# Patient Record
Sex: FEMALE | Race: WHITE | NOT HISPANIC OR LATINO | Employment: UNEMPLOYED | URBAN - METROPOLITAN AREA
[De-identification: names, ages, dates, MRNs, and addresses within clinical notes are randomized per-mention and may not be internally consistent; named-entity substitution may affect disease eponyms.]

---

## 2023-10-05 ENCOUNTER — TELEPHONE (OUTPATIENT)
Dept: PRIMARY CARE | Facility: CLINIC | Age: 58
End: 2023-10-05
Payer: COMMERCIAL

## 2023-10-05 DIAGNOSIS — I73.00 RAYNAUD'S DISEASE WITHOUT GANGRENE: ICD-10-CM

## 2023-10-05 DIAGNOSIS — E55.9 VITAMIN D DEFICIENCY: ICD-10-CM

## 2023-10-05 DIAGNOSIS — Z12.31 VISIT FOR SCREENING MAMMOGRAM: ICD-10-CM

## 2023-10-05 DIAGNOSIS — Z13.220 LIPID SCREENING: ICD-10-CM

## 2023-10-05 DIAGNOSIS — Z00.00 HEALTHCARE MAINTENANCE: Primary | ICD-10-CM

## 2023-10-05 DIAGNOSIS — M25.50 ARTHRALGIA, UNSPECIFIED JOINT: ICD-10-CM

## 2023-10-05 PROBLEM — L02.429 FURUNCLE OF THIGH: Status: ACTIVE | Noted: 2023-10-05

## 2023-10-05 PROBLEM — Z86.73 HISTORY OF TIA (TRANSIENT ISCHEMIC ATTACK): Status: ACTIVE | Noted: 2023-10-05

## 2023-10-05 PROBLEM — M79.89 INTERMITTENT PAIN AND SWELLING OF HAND: Status: ACTIVE | Noted: 2023-10-05

## 2023-10-05 PROBLEM — G44.86 CERVICOGENIC HEADACHE: Status: ACTIVE | Noted: 2023-10-05
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
## 2023-10-05 PROBLEM — M50.90 CERVICAL NECK PAIN WITH EVIDENCE OF DISC DISEASE: Status: ACTIVE | Noted: 2023-10-05

## 2023-10-05 PROBLEM — R74.8 ELEVATED LIVER ENZYMES: Status: ACTIVE | Noted: 2023-10-05

## 2023-10-05 PROBLEM — R53.83 FATIGUE: Status: ACTIVE | Noted: 2023-10-05

## 2023-10-05 PROBLEM — M79.643 INTERMITTENT PAIN AND SWELLING OF HAND: Status: ACTIVE | Noted: 2023-10-05

## 2023-10-05 PROBLEM — R77.2 ELEVATED ALPHA-FETOPROTEIN: Status: ACTIVE | Noted: 2023-10-05

## 2023-10-05 PROBLEM — M54.12 CERVICAL RADICULOPATHY AT C5: Status: ACTIVE | Noted: 2023-10-05

## 2023-10-05 PROBLEM — D12.6 COLON ADENOMAS: Status: ACTIVE | Noted: 2023-10-05

## 2023-10-05 PROBLEM — R31.21 ASYMPTOMATIC MICROSCOPIC HEMATURIA: Status: ACTIVE | Noted: 2023-10-05

## 2023-10-05 PROBLEM — Z12.11 COLON CANCER SCREENING: Status: ACTIVE | Noted: 2023-10-05

## 2023-10-05 RX ORDER — FUROSEMIDE 20 MG/1
20 TABLET ORAL 2 TIMES DAILY
COMMUNITY
Start: 2021-08-12 | End: 2023-12-04 | Stop reason: ALTCHOICE

## 2023-10-05 RX ORDER — CLOBETASOL PROPIONATE 0.5 MG/G
1 EMULSION TOPICAL 2 TIMES DAILY
COMMUNITY
Start: 2021-07-07 | End: 2023-12-21 | Stop reason: WASHOUT

## 2023-10-05 RX ORDER — ASPIRIN 81 MG/1
81 TABLET ORAL DAILY
COMMUNITY
Start: 2021-06-30 | End: 2023-12-04 | Stop reason: ALTCHOICE

## 2023-10-05 RX ORDER — ERGOCALCIFEROL 1.25 MG/1
50000 CAPSULE ORAL 2 TIMES WEEKLY
COMMUNITY
Start: 2020-11-03 | End: 2023-12-04 | Stop reason: ALTCHOICE

## 2023-10-05 RX ORDER — CYCLOBENZAPRINE HCL 5 MG
5 TABLET ORAL 3 TIMES DAILY PRN
COMMUNITY
Start: 2022-07-08 | End: 2023-12-04 | Stop reason: ALTCHOICE

## 2023-10-05 NOTE — TELEPHONE ENCOUNTER
Pt set up a physical for December and would like to have an order for a mammogram and would like to blood work order as well however she also wanted to know if there are any inflammation markers that could be added for issues with pain. Please advise on orders

## 2023-11-21 ENCOUNTER — TELEPHONE (OUTPATIENT)
Dept: PRIMARY CARE | Facility: CLINIC | Age: 58
End: 2023-11-21
Payer: COMMERCIAL

## 2023-11-21 NOTE — TELEPHONE ENCOUNTER
Pt called to confirm appt on 12/4/23 for CPE. Pt mention she has had hard swollen lymph node in armpit occurring for 1wk.

## 2023-12-01 ENCOUNTER — LAB (OUTPATIENT)
Dept: LAB | Facility: LAB | Age: 58
End: 2023-12-01
Payer: COMMERCIAL

## 2023-12-01 DIAGNOSIS — I73.00 RAYNAUD'S DISEASE WITHOUT GANGRENE: ICD-10-CM

## 2023-12-01 DIAGNOSIS — E55.9 VITAMIN D DEFICIENCY: ICD-10-CM

## 2023-12-01 DIAGNOSIS — Z00.00 HEALTHCARE MAINTENANCE: ICD-10-CM

## 2023-12-01 DIAGNOSIS — M25.50 ARTHRALGIA, UNSPECIFIED JOINT: ICD-10-CM

## 2023-12-01 DIAGNOSIS — Z13.220 LIPID SCREENING: ICD-10-CM

## 2023-12-01 LAB
25(OH)D3 SERPL-MCNC: 21 NG/ML (ref 30–100)
ALBUMIN SERPL BCP-MCNC: 4.2 G/DL (ref 3.4–5)
ALP SERPL-CCNC: 117 U/L (ref 33–110)
ALT SERPL W P-5'-P-CCNC: 44 U/L (ref 7–45)
ANION GAP SERPL CALC-SCNC: 13 MMOL/L (ref 10–20)
AST SERPL W P-5'-P-CCNC: 27 U/L (ref 9–39)
BASOPHILS # BLD AUTO: 0.03 X10*3/UL (ref 0–0.1)
BASOPHILS NFR BLD AUTO: 0.4 %
BILIRUB SERPL-MCNC: 0.5 MG/DL (ref 0–1.2)
BUN SERPL-MCNC: 12 MG/DL (ref 6–23)
CALCIUM SERPL-MCNC: 9.2 MG/DL (ref 8.6–10.3)
CCP IGG SERPL-ACNC: <1 U/ML
CHLORIDE SERPL-SCNC: 103 MMOL/L (ref 98–107)
CHOLEST SERPL-MCNC: 215 MG/DL (ref 0–199)
CHOLESTEROL/HDL RATIO: 3.4
CO2 SERPL-SCNC: 28 MMOL/L (ref 21–32)
CREAT SERPL-MCNC: 0.58 MG/DL (ref 0.5–1.05)
CRP SERPL-MCNC: 1.47 MG/DL
EOSINOPHIL # BLD AUTO: 0.03 X10*3/UL (ref 0–0.7)
EOSINOPHIL NFR BLD AUTO: 0.4 %
ERYTHROCYTE [DISTWIDTH] IN BLOOD BY AUTOMATED COUNT: 13.1 % (ref 11.5–14.5)
ERYTHROCYTE [SEDIMENTATION RATE] IN BLOOD BY WESTERGREN METHOD: 6 MM/H (ref 0–30)
GFR SERPL CREATININE-BSD FRML MDRD: >90 ML/MIN/1.73M*2
GLUCOSE SERPL-MCNC: 91 MG/DL (ref 74–99)
HCT VFR BLD AUTO: 39.2 % (ref 36–46)
HDLC SERPL-MCNC: 64.1 MG/DL
HGB BLD-MCNC: 12.2 G/DL (ref 12–16)
IMM GRANULOCYTES # BLD AUTO: 0.01 X10*3/UL (ref 0–0.7)
IMM GRANULOCYTES NFR BLD AUTO: 0.1 % (ref 0–0.9)
LDLC SERPL CALC-MCNC: 134 MG/DL
LYMPHOCYTES # BLD AUTO: 1.71 X10*3/UL (ref 1.2–4.8)
LYMPHOCYTES NFR BLD AUTO: 23.3 %
MCH RBC QN AUTO: 28.8 PG (ref 26–34)
MCHC RBC AUTO-ENTMCNC: 31.1 G/DL (ref 32–36)
MCV RBC AUTO: 93 FL (ref 80–100)
MONOCYTES # BLD AUTO: 0.52 X10*3/UL (ref 0.1–1)
MONOCYTES NFR BLD AUTO: 7.1 %
NEUTROPHILS # BLD AUTO: 5.05 X10*3/UL (ref 1.2–7.7)
NEUTROPHILS NFR BLD AUTO: 68.7 %
NON HDL CHOLESTEROL: 151 MG/DL (ref 0–149)
NRBC BLD-RTO: 0 /100 WBCS (ref 0–0)
PLATELET # BLD AUTO: 343 X10*3/UL (ref 150–450)
POTASSIUM SERPL-SCNC: 4.5 MMOL/L (ref 3.5–5.3)
PROT SERPL-MCNC: 7 G/DL (ref 6.4–8.2)
RBC # BLD AUTO: 4.24 X10*6/UL (ref 4–5.2)
RHEUMATOID FACT SER NEPH-ACNC: <10 IU/ML (ref 0–15)
SODIUM SERPL-SCNC: 139 MMOL/L (ref 136–145)
TRIGL SERPL-MCNC: 86 MG/DL (ref 0–149)
TSH SERPL-ACNC: 1.47 MIU/L (ref 0.44–3.98)
URATE SERPL-MCNC: 4.7 MG/DL (ref 2.3–6.7)
VLDL: 17 MG/DL (ref 0–40)
WBC # BLD AUTO: 7.4 X10*3/UL (ref 4.4–11.3)

## 2023-12-01 PROCEDURE — 80061 LIPID PANEL: CPT

## 2023-12-01 PROCEDURE — 36415 COLL VENOUS BLD VENIPUNCTURE: CPT

## 2023-12-01 PROCEDURE — 85652 RBC SED RATE AUTOMATED: CPT

## 2023-12-01 PROCEDURE — 86140 C-REACTIVE PROTEIN: CPT

## 2023-12-01 PROCEDURE — 82306 VITAMIN D 25 HYDROXY: CPT

## 2023-12-01 PROCEDURE — 84443 ASSAY THYROID STIM HORMONE: CPT

## 2023-12-01 PROCEDURE — 84550 ASSAY OF BLOOD/URIC ACID: CPT

## 2023-12-01 PROCEDURE — 86200 CCP ANTIBODY: CPT

## 2023-12-01 PROCEDURE — 85025 COMPLETE CBC W/AUTO DIFF WBC: CPT

## 2023-12-01 PROCEDURE — 80053 COMPREHEN METABOLIC PANEL: CPT

## 2023-12-01 PROCEDURE — 86038 ANTINUCLEAR ANTIBODIES: CPT

## 2023-12-01 PROCEDURE — 86431 RHEUMATOID FACTOR QUANT: CPT

## 2023-12-03 NOTE — PROGRESS NOTES
"Subjective   Patient ID: Gina Montes De Oca is a 58 y.o. female who presents for her annual physical     She feels horrible daily. She feels stiff.  \"Everything hurts\" She takes 2 Ibuprofen when she has aching and it makes a huge difference     She reports fighting UR Sx for the past week.     She admits she has not been very active     She is scheduled for her mammogram within a couple weeks     She is wearing her old glasses because she broke her new ones  She needs more correction as she has aged after having LASIK    HEALTH:  PAP 10/11, 9/13 +ASCUS, 12/17 -,  8/2022 -  Mammo 10/13 , 12/14, 10/17, 9/19, 10/2020, 11/2021, ordered 12/2023  BD 4/04 T-1.0 heel , 10/17 normal and next 55 -60  Colon 12/17 2 adenomas, 9/2022 and adenoma and Q 5   EKG 3/13 AND 12/15 , 12/ 17, 9/19, 6/2021   Urine 2014 , 12/15 , 12/17, 9/19  Hep C negative in 12/2020   Hep B 4/2021 and 5/2021  Flu 2014, 12/15 , 10/17, 9/19, 9/2020, 2021, 2022, 11/8/2023  TDAP 2010, 8/2/2022  MMR 9/19  Shingrix 11/3/2020 and 2/9/2021   Pfizer CVD vaccine 3/10/2021 and 3/30/2021, 11/8/2023  Ophth She had LASIK in 2000 OU. No history of glaucoma or MD. She is back in glasses almost all the time        Review of Systems  All systems negative except those listed in the HPI       Past Medical, Surgical, and Family History reviewed and updated in chart.  Reviewed all medications by prescribing practitioner or clinical pharmacist   (such as prescriptions, OTCs, herbal therapies and supplements) and documented in the medical record       Objective   Visit Vitals  /70 (BP Location: Left arm, Patient Position: Sitting)   Pulse 76   Temp 36.2 °C (97.1 °F) (Temporal)    Body mass index is 27.62 kg/m².      Physical Exam  Vitals reviewed.   Constitutional:       Appearance: Normal appearance.   HENT:      Head: Normocephalic.      Right Ear: Tympanic membrane, ear canal and external ear normal.      Left Ear: Tympanic membrane, ear canal and external ear normal. "      Nose: Nose normal.      Mouth/Throat:      Pharynx: Oropharynx is clear.   Eyes:      Conjunctiva/sclera: Conjunctivae normal.   Cardiovascular:      Rate and Rhythm: Normal rate and regular rhythm.      Pulses: Normal pulses.      Heart sounds: Normal heart sounds.   Pulmonary:      Effort: Pulmonary effort is normal.      Breath sounds: Normal breath sounds.   Chest:      Comments: Breast exam shows dense breast with fibrocystic changes bilaterally, no masses felt   Abdominal:      General: Bowel sounds are normal.      Palpations: Abdomen is soft.   Musculoskeletal:         General: Normal range of motion.      Cervical back: Normal range of motion and neck supple.      Comments:  neck and shoulder still tight    Skin:     General: Skin is warm.   Neurological:      General: No focal deficit present.      Mental Status: She is alert and oriented to person, place, and time.   Psychiatric:         Mood and Affect: Mood normal.         Behavior: Behavior normal.         Thought Content: Thought content normal.         Judgment: Judgment normal.       Assessment/Plan   Problem List Items Addressed This Visit       Vitamin D deficiency - Primary    Relevant Medications    ergocalciferol (Vitamin D-2) 1.25 MG (12442 UT) capsule       Annual physical completed  Reviewed her labs from 12/2023  CRP 1.47  JAMES negative   RF negative       Health Maintenence completed  -  Discussed healthy diet and regular exercise.    -  Physical exam overall unremarkable. Immunizations reviewed and updated accordingly. Healthy lifestyle choices discussed (tobacco avoidance, appropriate alcohol use, avoidance of illicit substances).   -  Patient is wearing seatbelt.   -  Screening lab work ordered as indicated.    -  Age appropriate screening tests reviewed with patient.       Her weight in office today is 166 with BMI of 27.62. We spent 15 minutes discussing diet and weight loss. The struggle of weight loss persists    Discussed  getting BMI as close to 25 as possible.      BP have been in normal range in office. We will continue to monitor   EKG was normal 6/2021   Recommend she monitor her BP periodically and call with elevated readings      I have spent 15 min face to face with this patient discussing their cardiac risk and behavioral therapies of nutrition choices and exercise. We are trying to eliminate habits that are contributing to their cardiac risk.  We agreed on a plan of how they can reduce their current CV risk   The patient's  10 yr CV risk was estimated at  1.5 % 12/2023     Elevated lipids:  and HDL 64 on labs in 12/2023  This is familial hypercholesteremia   Explained goal for LDL to be less than 100 and ideally less than 70   Continue Lipitor 40 mg daily and ASA 81 mg daily   Discussed diet and exercise.    Elevated liver enzymes and elevated alpha- fetoprotein: LFT stable on labs in 12/2023  Liver US normal 1/2021  Fibroscan 2021 normal per patient - I could not find the results of this test  Continue to follow with Dr Bal       Migraine - felt complex migraine   Admitted on 6/27/2021 and discharged on 6/28/2021    TIA was unconfirmed. All tests ruled out TIA   She had CARD and left side and right face/ arm and leg paresthesia   CT of brain was negative for acute process 6/2021   MRI of brain 7/6/2021 negative for acute process   MRI of spine 6/2021 moderate bilaterally varying degrees of stenosis but no high grade compression   ECHO 6/2021 showed LVEF 62%  Evaluated by neuro while in the hospital   She is not prone to migraines continuously and I do not recommend medications for treatment at this time      Increased stressors:  Her  finished chemo and his last scan showed he is clear of cancer.   He had surgery in [place]. They took 1/2 his liver and 2/3 of his colon and he completed 6 months of chemo  Her son had facial surgery 5/2021 due to baseball injury      Cervical radiculopathy w/ radiating pain/  "paresthesia in the right arm: On exam: neck and shoulder still tight 12/2023. She feels horrible daily. She feels stiff. \"Everything hurts\" She takes 2 Ibuprofen when she has aching and it makes a huge difference. Recommend she avoid caffeine and stimulants. Drink plenty of water. Discussed doing a plant based /whole foods diet. Her Vitamin D levels are low and may be contributing to her aching. She will restart scripted Vitamin D and we will see if that helps   Recommend she take Ibuprofen 3 tablets daily for 10 days to help decrease the inflammation   She has neck pain radiating into her shoulder and down the right arm   She is waking with HA due to neck pain.   She stretches her neck gently and it does loosen up during the day   She has trouble opening a bottle of water. She is not dropping things   She tried massage therapy for a while but could not tell any improvement   MRI of spine 6/2021 moderate bilaterally varying degrees of stenosis, no high grade compression   She did PT for her neck in the past.   She is to continue to move her neck and shoulders to avoid frozen shoulder      Arthritis and intermittent edema to hands bilaterally:   Inflammatory labs negative in 7/2021  She notices joint pain in fingers on her right hand.  Her sister has RA   Continue furosemide 20 mg daily.     Raynaud's: JAMES negative for the first time on labs in 12/2023  Stable. + JAMES only 12/2020  Keep hands and feet warm during winter months     Vitamin D def: Levels 21 on labs in 12/2023. Explained with low levels of Vitamin D that can increase her aching in her joints   I would like to see her levels in the 40's   Continue scripted Vitamin D 50K UT twice weekly for 3 months.  After 3 months she will change to OTC Vitamin D 5000 UT daily      Mild prolapse uterus: Pap normal 8/2022.   Vaginal exam small polyp noted 8/2022      Mammo was normal in 11/2021 and orders placed 8/2022  Breast exam shows dense breast with fibrocystic " changes bilaterally, no masses felt 12/2023  Discussed MRI of breast due to breast density. Explained out of pocket cost with screening 8/2022- she will consider     BD was normal 10/17 and repeat in 5 -10 yrs. Continue OTC Calcium 600 mg daily along with 2 servings of calcium enriched foods. Continue scripted Vitamin D twice weekly and discussed importance of weight bearing exercises  Colon 9/2022 adenoma and Q 5      Ophth:  She had LASIK in 2000 OU. No history of glaucoma or MD.   She is back in glasses almost all the time   She will have her next eye exam faxed to my office in order to update her medical records.      Hep C negative in 12/2020   Hep B 4/2021 and 5/2021  Flu 2014, 12/15 , 10/17, 9/19, 9/2020, 2021, 2022, 11/8/2023  TDAP 2010, 8/2/2022  MMR 9/19  Shingrix 11/3/2020 and 2/9/2021   Pfizer CVD vaccine 3/10/2021 and 3/30/2021, 11/8/2023    Some elements in the chart were copied from Dr. Gurrola's last office visit with patient.   Notes have been updated where appropriate, and reflect my current medical decision making from today.      Return in 1 year for CPE or sooner if needed   (CPE due 12/2024)     Scribe Attestation  By signing my name below, I, Carly Melara , Scribe   attest that this documentation has been prepared under the direction and in the presence of Alicja Gurrola MD.

## 2023-12-04 ENCOUNTER — OFFICE VISIT (OUTPATIENT)
Dept: PRIMARY CARE | Facility: CLINIC | Age: 58
End: 2023-12-04
Payer: COMMERCIAL

## 2023-12-04 VITALS
TEMPERATURE: 97.1 F | BODY MASS INDEX: 27.66 KG/M2 | HEIGHT: 65 IN | SYSTOLIC BLOOD PRESSURE: 110 MMHG | OXYGEN SATURATION: 99 % | DIASTOLIC BLOOD PRESSURE: 70 MMHG | HEART RATE: 76 BPM | WEIGHT: 166 LBS

## 2023-12-04 DIAGNOSIS — Z86.73 HISTORY OF TIA (TRANSIENT ISCHEMIC ATTACK): ICD-10-CM

## 2023-12-04 DIAGNOSIS — E55.9 VITAMIN D DEFICIENCY: ICD-10-CM

## 2023-12-04 DIAGNOSIS — Z00.00 HEALTHCARE MAINTENANCE: Primary | ICD-10-CM

## 2023-12-04 DIAGNOSIS — M50.90 CERVICAL NECK PAIN WITH EVIDENCE OF DISC DISEASE: ICD-10-CM

## 2023-12-04 DIAGNOSIS — I73.00 RAYNAUD'S DISEASE WITHOUT GANGRENE: ICD-10-CM

## 2023-12-04 DIAGNOSIS — G43.109 MIGRAINE WITH AURA AND WITHOUT STATUS MIGRAINOSUS, NOT INTRACTABLE: ICD-10-CM

## 2023-12-04 LAB — ANA SER QL HEP2 SUBST: NEGATIVE

## 2023-12-04 PROCEDURE — 1036F TOBACCO NON-USER: CPT | Performed by: INTERNAL MEDICINE

## 2023-12-04 PROCEDURE — 99396 PREV VISIT EST AGE 40-64: CPT | Performed by: INTERNAL MEDICINE

## 2023-12-04 RX ORDER — ERGOCALCIFEROL 1.25 MG/1
50000 CAPSULE ORAL 2 TIMES WEEKLY
Qty: 24 CAPSULE | Refills: 1 | Status: SHIPPED | OUTPATIENT
Start: 2023-12-04 | End: 2024-05-16

## 2023-12-04 ASSESSMENT — PATIENT HEALTH QUESTIONNAIRE - PHQ9
1. LITTLE INTEREST OR PLEASURE IN DOING THINGS: NOT AT ALL
SUM OF ALL RESPONSES TO PHQ9 QUESTIONS 1 AND 2: 0
2. FEELING DOWN, DEPRESSED OR HOPELESS: NOT AT ALL

## 2023-12-05 PROBLEM — M79.643 INTERMITTENT PAIN AND SWELLING OF HAND: Status: RESOLVED | Noted: 2023-10-05 | Resolved: 2023-12-05

## 2023-12-05 PROBLEM — M79.89 INTERMITTENT PAIN AND SWELLING OF HAND: Status: RESOLVED | Noted: 2023-10-05 | Resolved: 2023-12-05
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
## 2023-12-11 ENCOUNTER — ANCILLARY PROCEDURE (OUTPATIENT)
Dept: RADIOLOGY | Facility: CLINIC | Age: 58
End: 2023-12-11
Payer: COMMERCIAL

## 2023-12-11 DIAGNOSIS — Z12.31 ENCOUNTER FOR SCREENING MAMMOGRAM FOR MALIGNANT NEOPLASM OF BREAST: ICD-10-CM

## 2023-12-11 PROCEDURE — 77063 BREAST TOMOSYNTHESIS BI: CPT

## 2023-12-11 PROCEDURE — 77063 BREAST TOMOSYNTHESIS BI: CPT | Mod: BILATERAL PROCEDURE | Performed by: RADIOLOGY

## 2023-12-11 PROCEDURE — 77067 SCR MAMMO BI INCL CAD: CPT | Mod: BILATERAL PROCEDURE | Performed by: RADIOLOGY

## 2023-12-21 ENCOUNTER — OFFICE VISIT (OUTPATIENT)
Dept: PRIMARY CARE | Facility: CLINIC | Age: 58
End: 2023-12-21
Payer: COMMERCIAL

## 2023-12-21 VITALS
HEART RATE: 66 BPM | DIASTOLIC BLOOD PRESSURE: 68 MMHG | HEIGHT: 65 IN | SYSTOLIC BLOOD PRESSURE: 116 MMHG | BODY MASS INDEX: 27.86 KG/M2 | WEIGHT: 167.2 LBS | OXYGEN SATURATION: 98 %

## 2023-12-21 DIAGNOSIS — L03.116 CELLULITIS OF LEFT LOWER EXTREMITY: Primary | ICD-10-CM

## 2023-12-21 PROCEDURE — 1036F TOBACCO NON-USER: CPT | Performed by: INTERNAL MEDICINE

## 2023-12-21 PROCEDURE — 99214 OFFICE O/P EST MOD 30 MIN: CPT | Performed by: INTERNAL MEDICINE

## 2023-12-21 RX ORDER — CEPHALEXIN 500 MG/1
500 CAPSULE ORAL 3 TIMES DAILY
Qty: 30 CAPSULE | Refills: 0 | Status: SHIPPED | OUTPATIENT
Start: 2023-12-21 | End: 2023-12-31

## 2023-12-21 NOTE — PROGRESS NOTES
"Subjective   Patient ID: [id number]      Gina Montes De Oca is a 58 y.o. female who presents for Rash (Patient c/o rash on left foot. Onset about 2 weeks ago. Patient admits to swelling and itching. Rash is red. Patient has applied gold bond powder. Patient states that today her foot does feel slightly better.).    Current Outpatient Medications:     ergocalciferol (Vitamin D-2) 1.25 MG (63613 UT) capsule, Take 1 capsule (50,000 Units) by mouth 2 times a week., Disp: 24 capsule, Rfl: 1  HPI  58-year-old patient presented to the office to discuss concerns regarding erythema in her foot .  Patient stated that she started experiencing discomfort in the fourth and fifth digits and it was itchy and she felt that there might be a rash in between and then she started noticing increased redness involving the left foot and swelling as well it was tender and slightly warm and she became concerned.  Yesterday she felt a little bit warm and had some chills but for the most part she feels fine and she has good appetite and she does not feel weak.  Review of system was reviewed all normal except what is noted in HPI   Past Medical History:   Diagnosis Date    Allergic contact dermatitis due to plants, except food 10/10/2016    Contact dermatitis due to poison ivy    Cellulitis of left upper limb 10/13/2017    Cellulitis of left upper arm    Cellulitis of right lower limb 10/17/2017    Cellulitis of right ankle    Hemiplegic migraine, not intractable, without status migrainosus 07/07/2021    Hemiplegic migraine without status migrainosus, not intractable    Personal history of other specified conditions 03/01/2013    History of chest pain      Objective   /68 (BP Location: Right arm, Patient Position: Sitting)   Pulse 66   Ht 1.651 m (5' 5\")   Wt 75.8 kg (167 lb 3.2 oz)   SpO2 98%   BMI 27.82 kg/m²      Physical Exam    Assessment/Plan   Problem List Items Addressed This Visit    None  58-year-old patient with the " following issues.    1.  Symptoms suggestive of cellulitis of the left foot at this point we will treat the patient with a course of antibiotic a prescription for Keflex was sent to the pharmacy and she was instructed to keep the leg elevated and apply ice packs.  Patient stated understanding and she was instructed if her symptoms worsen or do not improve she is to present for further evaluation.    Ivania Lackey MD

## 2024-02-13 ENCOUNTER — TELEPHONE (OUTPATIENT)
Dept: PRIMARY CARE | Facility: CLINIC | Age: 59
End: 2024-02-13
Payer: COMMERCIAL

## 2024-02-13 NOTE — TELEPHONE ENCOUNTER
She is in a research study and had an ecg yesterday, she was told she has had a possible inferior infarction. Can you please call her when you have some time?

## 2024-04-17 ENCOUNTER — HOSPITAL ENCOUNTER (OUTPATIENT)
Dept: RADIOLOGY | Facility: CLINIC | Age: 59
Discharge: HOME | End: 2024-04-17
Payer: COMMERCIAL

## 2024-04-17 ENCOUNTER — OFFICE VISIT (OUTPATIENT)
Dept: PRIMARY CARE | Facility: CLINIC | Age: 59
End: 2024-04-17
Payer: COMMERCIAL

## 2024-04-17 VITALS
OXYGEN SATURATION: 98 % | BODY MASS INDEX: 27.82 KG/M2 | SYSTOLIC BLOOD PRESSURE: 110 MMHG | HEART RATE: 68 BPM | HEIGHT: 65 IN | TEMPERATURE: 97.6 F | WEIGHT: 167 LBS | DIASTOLIC BLOOD PRESSURE: 70 MMHG

## 2024-04-17 DIAGNOSIS — L02.612 ABSCESS OR CELLULITIS OF TOE, LEFT: ICD-10-CM

## 2024-04-17 DIAGNOSIS — L02.612 ABSCESS OR CELLULITIS OF TOE, LEFT: Primary | ICD-10-CM

## 2024-04-17 DIAGNOSIS — L03.032 ABSCESS OR CELLULITIS OF TOE, LEFT: Primary | ICD-10-CM

## 2024-04-17 DIAGNOSIS — L03.032 ABSCESS OR CELLULITIS OF TOE, LEFT: ICD-10-CM

## 2024-04-17 PROCEDURE — 99214 OFFICE O/P EST MOD 30 MIN: CPT | Performed by: INTERNAL MEDICINE

## 2024-04-17 PROCEDURE — 1036F TOBACCO NON-USER: CPT | Performed by: INTERNAL MEDICINE

## 2024-04-17 PROCEDURE — 73620 X-RAY EXAM OF FOOT: CPT | Mod: LT

## 2024-04-17 PROCEDURE — 73620 X-RAY EXAM OF FOOT: CPT | Mod: LEFT SIDE | Performed by: RADIOLOGY

## 2024-04-17 RX ORDER — DOXYCYCLINE 100 MG/1
100 CAPSULE ORAL 2 TIMES DAILY
Qty: 28 CAPSULE | Refills: 0 | Status: SHIPPED | OUTPATIENT
Start: 2024-04-17 | End: 2024-05-01

## 2024-04-17 ASSESSMENT — PATIENT HEALTH QUESTIONNAIRE - PHQ9
SUM OF ALL RESPONSES TO PHQ9 QUESTIONS 1 AND 2: 0
2. FEELING DOWN, DEPRESSED OR HOPELESS: NOT AT ALL
1. LITTLE INTEREST OR PLEASURE IN DOING THINGS: NOT AT ALL

## 2024-04-17 NOTE — PROGRESS NOTES
Subjective   Patient ID: Gina Montes De Oca is a 58 y.o. female who presents for evaluation for left foot infection involving the 4th and 5th metatarsal.       She is here for a possible foot infection on her left foot involving the 4th and 5th metatarsal.   It is red and painful  She saw another physician in 12/2023 and was placed on Keflex   She is putting her foot in Epsom salt BID and she had her Sx controlled.  She walked all day Saturday 4/13/2024 and she started developing Sx again and it is spreading           HEALTH:  PAP 10/11, 9/13 +ASCUS, 12/17 -,  8/22 -  Mammo 10/13, 12/14, 10/17, 9/19, 10/20, 11/21, ordered 12/23  BD 4/04 T-1.0 heel, 10/17 normal and next 55 -60  Colon 12/17 adenomas, 9/2022 and adenoma and Q 5   EKG 3/13, 12/15, 12/17, 9/19, 6/21   Urine 2014, 12/15, 12/17, 9/19  Hep C negative in 12/2020   Hep B 4/2021 and 5/2021  Flu 10/17, 9/19, 9/20, 2021, 2022, 11/23  TDAP 2010, 8/2/2022  MMR 9/19  Shingrix 11/3/2020 and 2/9/2021   Pfizer CVD vaccine 3/10/2021 and 3/30/2021, 11/8/2023  Ophth She had LASIK in 2000 OU. No history of glaucoma or MD. She is back in glasses almost all the time            Review of Systems  All systems negative except those listed in the HPI      Objective   Visit Vitals  /70 (BP Location: Left arm, Patient Position: Sitting)   Pulse 68   Temp 36.4 °C (97.6 °F) (Temporal)    Body mass index is 27.79 kg/m².     Physical Exam  Vitals reviewed.   Constitutional:       Appearance: Normal appearance.   HENT:      Head: Normocephalic.      Right Ear: Tympanic membrane, ear canal and external ear normal.      Left Ear: Tympanic membrane, ear canal and external ear normal.      Nose: Nose normal.      Mouth/Throat:      Pharynx: Oropharynx is clear.   Eyes:      Conjunctiva/sclera: Conjunctivae normal.   Cardiovascular:      Rate and Rhythm: Normal rate and regular rhythm.      Pulses: Normal pulses.      Heart sounds: Normal heart sounds.   Pulmonary:      Effort:  Pulmonary effort is normal.      Breath sounds: Normal breath sounds.   Abdominal:      General: Bowel sounds are normal.      Palpations: Abdomen is soft.   Musculoskeletal:         General: Normal range of motion.      Cervical back: Normal range of motion and neck supple.   Skin:     General: Skin is warm.      Comments: there is a abscess by the nail of the 5th toe, primary infection is the medial 5th toe, she has erythema top foot and 4th toe, no skin breakdown   Neurological:      General: No focal deficit present.      Mental Status: She is alert and oriented to person, place, and time.   Psychiatric:         Mood and Affect: Mood normal.         Behavior: Behavior normal.         Thought Content: Thought content normal.         Judgment: Judgment normal.       Assessment/Plan   Problem List Items Addressed This Visit    None  Visit Diagnoses       Abscess or cellulitis of toe, left    -  Primary    Relevant Medications    doxycycline (Vibramycin) 100 mg capsule    Other Relevant Orders    XR foot left 1-2 views    Referral to Podiatry                Follow up completed    Abscess or cellulitis left toe: On exam: there is a abscess by the nail of the 5th toe, primary infection is the medial 5th toe, she has erythema top foot and 4th toe, no skin breakdown 4/2024. Explained I think this is due to her toes rubbing while in shoes.  She is here for a possible foot infection on her left foot involving the 4th and 5th metatarsal.   It is red and painful. This is the 3 rd occurrence of this infection she has had   She saw another physician in 12/2023 and was placed on Keflex   She was putting her foot in Epsom salt BID and she had her Sx controlled.  She walked all day Saturday 4/13/2024 and she started developing Sx again and it is spreading   Prescription sent in for doxycycline 100 mg BID for 14 days, possible side effects discussed with medication.   Recommend and orders placed for Xray left foot for further  evaluation 4/24  Recommend and orders placed for podiatry for further evaluation 4/24  She is going to need a toe separator of some kind   She will call if Sx persist or worsen     Her weight in office today is 167 with BMI of 27.79. We spent 15 minutes discussing diet and weight loss. The struggle of weight loss persists    Discussed getting BMI as close to 25 as possible.      BP have been in normal range in office. We will continue to monitor   EKG was normal 6/2021   Recommend she monitor her BP periodically and call with elevated readings      We discussed the patients cardiovascular risk. If needed, lifestyle modifications recommended including: behavioral therapies of nutrition choices, exercise and eliminate habits that are contributing to their cardiac risk. We agreed to a plan to decrease his cardiovascular risks. Discussed ASA. Reviewed Guidelines and approved recommendations made to patient.   The patient's 10 yr CV risk was estimated at  1.7% 4/2024      Elevated lipids:  and HDL 64 on labs in 12/2023  This is familial hypercholesteremia   Explained goal for LDL to be less than 100 and ideally less than 70   Continue Lipitor 40 mg daily and ASA 81 mg daily   Discussed diet and exercise.     Elevated liver enzymes and elevated alpha- fetoprotein: LFT stable on labs in 12/2023  Liver US normal 1/2021  Fibroscan 2021 normal per patient - I could not find the results of this test  Continue to follow with Dr Bal       Migraine - felt complex migraine   Admitted on 6/27/2021 and discharged on 6/28/2021    TIA was unconfirmed. All tests ruled out TIA   She had CARD and left side and right face/ arm and leg paresthesia   CT of brain was negative for acute process 6/2021   MRI of brain 7/6/2021 negative for acute process   MRI of spine 6/2021 moderate bilaterally varying degrees of stenosis but no high grade compression   ECHO 6/2021 showed LVEF 62%  Evaluated by neuro while in the hospital   She is not  "prone to migraines continuously and I do not recommend medications for treatment at this time      Increased stressors:  Her  finished chemo and his last scan showed he is clear of cancer.   He had surgery in [place]. They took 1/2 his liver and 2/3 of his colon and he completed 6 months of chemo  Her son had facial surgery 5/2021 due to baseball injury      Cervical radiculopathy w/ radiating pain/ paresthesia in the right arm: On exam: neck and shoulder still tight 12/2023. She feels horrible daily. She feels stiff. \"Everything hurts\" She takes 2 Ibuprofen when she has aching and it makes a huge difference. Recommend she avoid caffeine and stimulants. Drink plenty of water. Discussed doing a plant based /whole foods diet. Her Vitamin D levels are low and may be contributing to her aching. She will restart scripted Vitamin D and we will see if that helps   Recommend she take Ibuprofen 3 tablets daily for 10 days to help decrease the inflammation   She has neck pain radiating into her shoulder and down the right arm   She is waking with HA due to neck pain.   She stretches her neck gently and it does loosen up during the day   She has trouble opening a bottle of water. She is not dropping things   She tried massage therapy for a while but could not tell any improvement   MRI of spine 6/2021 moderate bilaterally varying degrees of stenosis, no high grade compression   She did PT for her neck in the past.   She is to continue to move her neck and shoulders to avoid frozen shoulder      Arthritis and intermittent edema to hands bilaterally:   Inflammatory labs negative in 7/2021  She notices joint pain in fingers on her right hand.  Her sister has RA   Continue furosemide 20 mg daily.     Raynaud's: JAMES negative for the first time on labs in 12/2023  Stable. + JAMES only 12/2020  Keep hands and feet warm during winter months      Vitamin D def: Levels 21 on labs in 12/2023. Explained with low levels of Vitamin " D that can increase her aching in her joints   I would like to see her levels in the 40's   Continue scripted Vitamin D 50K UT twice weekly for 3 months.  After 3 months she will change to OTC Vitamin D 5000 UT daily      Mild prolapse uterus: Pap normal 8/2022.   Vaginal exam small polyp noted 8/2022      Mammo was normal in 11/2021 and orders placed 8/2022  Breast exam shows dense breast with fibrocystic changes bilaterally, no masses felt 12/2023  Discussed MRI of breast due to breast density. Explained out of pocket cost with screening 8/2022- she will consider     BD was normal 10/17 and repeat in 5 -10 yrs. Continue OTC Calcium 600 mg daily along with 2 servings of calcium enriched foods. Continue scripted Vitamin D twice weekly and discussed importance of weight bearing exercises  Colon 9/2022 adenoma and Q 5      Ophth:  She had LASIK in 2000 OU. No history of glaucoma or MD.   She is back in glasses almost all the time   She will have her next eye exam faxed to my office in order to update her medical records.      Hep C negative in 12/2020   Hep B 4/2021 and 5/2021  Flu 10/17, 9/19, 9/20, 2021, 2022, 11/23  TDAP 2010, 8/2/2022  MMR 9/19  Shingrix 11/3/2020 and 2/9/2021   Pfizer CVD vaccine 3/10/2021 and 3/30/2021, 11/8/2023     Some elements in the chart were copied from Dr. Gurrola's last office visit with patient.   Notes have been updated where appropriate, and reflect my current medical decision making from today.      Return in 1 year for CPE or sooner if needed   (CPE due 12/2024)      Scribe Attestation  By signing my name below, I, Carly Kelton , Scribe   attest that this documentation has been prepared under the direction and in the presence of Alicja Gurrola MD.

## 2024-04-19 NOTE — RESULT ENCOUNTER NOTE
Hi- the xray shows no swelling or bone destruction   If not get better than will need MRI but feel podiatry most help

## 2024-05-16 DIAGNOSIS — E55.9 VITAMIN D DEFICIENCY: ICD-10-CM

## 2024-05-16 RX ORDER — ERGOCALCIFEROL 1.25 MG/1
50000 CAPSULE ORAL 2 TIMES WEEKLY
Qty: 24 CAPSULE | Refills: 1 | Status: SHIPPED | OUTPATIENT
Start: 2024-05-16 | End: 2024-10-31

## 2024-10-31 DIAGNOSIS — E55.9 VITAMIN D DEFICIENCY: ICD-10-CM

## 2024-10-31 RX ORDER — ERGOCALCIFEROL 1.25 MG/1
50000 CAPSULE ORAL 2 TIMES WEEKLY
Qty: 24 CAPSULE | Refills: 1 | Status: SHIPPED | OUTPATIENT
Start: 2024-10-31 | End: 2025-04-17

## 2024-12-03 DIAGNOSIS — Z00.00 HEALTHCARE MAINTENANCE: Primary | ICD-10-CM

## 2024-12-03 DIAGNOSIS — E55.9 VITAMIN D DEFICIENCY: ICD-10-CM

## 2024-12-04 NOTE — PROGRESS NOTES
Subjective   Patient ID: Gina Montes De Oca is a 59 y.o. female who presents for her annual physical       She is in a study at [facility] and they did an EKG and she was told she had an MI  She has done nothing with this information.     The Raynaud's is not as bad as it used to be     Her HA have improved significantly     She went to UC after visit with PCP and Dx with fungal infection with bacterial infection left toe. The infection has resolved.         HEALTH:  PAP 10/11, 9/13 +ASCUS, 12/17,  8/22  Mammo 10/17, 9/19, 10/20, 11/21, 12/23, ordered 12/24  BD 4/04 T-1.0 heel, 10/17 normal and next 55 -60  Colon 12/17 adenomas, 9/22 adenoma and Q 5   EKG 3/13, 12/15, 12/17, 9/19, 6/21, 12/24  Urine 2014, 12/15, 12/17, 9/19  Hep C negative in 12/2020   Hep B 4/2021 and 5/2021  Flu 9/20, 2021, 2022, 11/23, 10/24  TDAP 2010, 8/2/2022  MMR 9/19  Shingrix 11/3/2020 and 2/9/2021   Pfizer CVD 3/10/2021 and 3/30/2021, 11/8/2023  Ophth She had LASIK in 2000 OU. She relies on her glasses more for distance and near. No history of glaucoma or MD.         Review of Systems  All systems negative except those listed in the HPI      Past Medical, Surgical, and Family History reviewed and updated in chart.  Reviewed all medications by prescribing practitioner or clinical pharmacist   (such as prescriptions, OTCs, herbal therapies and supplements) and documented in the medical record       Objective   Visit Vitals  /60 (BP Location: Left arm, Patient Position: Sitting, BP Cuff Size: Adult)   Pulse 68    Body mass index is 28.68 kg/m².     Physical Exam  Vitals reviewed.   Constitutional:       Appearance: Normal appearance.   HENT:      Head: Normocephalic.      Right Ear: Tympanic membrane, ear canal and external ear normal.      Left Ear: Tympanic membrane, ear canal and external ear normal.      Nose: Nose normal.      Mouth/Throat:      Pharynx: Oropharynx is clear.   Eyes:      Conjunctiva/sclera: Conjunctivae normal.    Cardiovascular:      Rate and Rhythm: Normal rate and regular rhythm.      Pulses: Normal pulses.      Heart sounds: Normal heart sounds.   Pulmonary:      Effort: Pulmonary effort is normal.      Breath sounds: Normal breath sounds.   Chest:      Comments: Breast exam shows dense breast with fibrocystic changes bilaterally, no masses felt   Abdominal:      General: Bowel sounds are normal.      Palpations: Abdomen is soft.   Musculoskeletal:         General: Normal range of motion.      Cervical back: Normal range of motion and neck supple.   Skin:     General: Skin is warm.   Neurological:      General: No focal deficit present.      Mental Status: She is alert and oriented to person, place, and time.   Psychiatric:         Mood and Affect: Mood normal.         Behavior: Behavior normal.         Thought Content: Thought content normal.         Judgment: Judgment normal.       Assessment/Plan   Problem List Items Addressed This Visit       Asymptomatic microscopic hematuria    Cervicogenic headache    Elevated alpha-fetoprotein    Migraine with aura, not intractable    Raynaud's disease    Vitamin D deficiency    Relevant Medications    cholecalciferol (Vitamin D3) 5,000 Units tablet     Other Visit Diagnoses       Healthcare maintenance  (Chronic)   -  Primary    Abnormal EKG                   Annual physical completed  Annual labs ordered     Health Maintenence completed  -  Discussed healthy diet and regular exercise.    -  Physical exam overall unremarkable. Immunizations reviewed and updated accordingly. Healthy lifestyle choices discussed (tobacco avoidance, appropriate alcohol use, avoidance of illicit substances).   -  Patient is wearing seatbelt.   -  Screening lab work ordered as indicated.    -  Age appropriate screening tests reviewed with patient.       She is  with 2 children  She denies previous history of tobacco use     Abscess or cellulitis left toe: Resolved with treatment 12/24. She  went to UC after visit with PCP and Dx with fungal infection with bacterial infection left toe. The infection has resolved.   She completed a full course of doxycycline    Xray Lt foot 4/24 no evidence for osteomyelitis. Degenerative changes and calcaneal spur  Orders placed for podiatry 4/24- she has not made an appt with podiatry   She is going to need a toe separator when exercising      Her weight in office is 175 with BMI of 28.68. We spent 15 minutes discussing diet and weight loss. The struggle of weight loss persists    Discussed getting BMI as close to 25 as possible.   Recommend she look into a plant based/ whole foods diet      BP have been in normal range in office. We will continue to monitor. She is in a study at [facility] and they did an EKG and she was told she had an MI. She has done nothing with this information. EKG ordered 12/24 and ECHO ordered for further evaluation 12/24   EKG 12/24 inferior changes consistent with inferior infarct new from EKG 2021, NS at 65 bpm  ECHO 6/21 showed LVEF 62% and ordered 12/24   Recommend she monitor her BP periodically and call with elevated readings      I have spent 15 min face to face with this patient discussing their cardiac risk   We discussed the patients cardiovascular risk. If needed, lifestyle modifications recommended including: behavioral therapies of nutrition choices, exercise and eliminate habits that are contributing to their cardiac risk. We agreed to a plan to decrease his cardiovascular risks. Discussed ASA. Reviewed Guidelines and approved recommendations made to patient.   The patient's 10 yr CV risk was estimated at  2 %  12/2024      Elevated lipids: Labs ordered and we will adjust if indicated  12/24  Explained goal for LDL to be less than 100 and ideally less than 70    This is familial hypercholesteremia   Continue atorvastatin 40 mg daily and ASA 81 mg daily   Recommend she look into a plant based/ whole foods diet       Elevated liver  enzymes/elevated alpha- fetoprotein: Labs ordered and we will adjust if indicated  12/24   Liver US normal 1/2021  Fibroscan 2021 normal per patient - I could not find the results of this test  Continue to follow with Dr Bal       Migraine - felt complex migraine. Her HA have improved significantly    Admitted on 6/27/2021 and discharged on 6/28/2021    TIA was unconfirmed. All tests ruled out TIA   She had CARD and left side and right face/ arm and leg paresthesia   CT of brain was negative for acute process 6/2021   MRI brain 7/21 negative for acute process   Evaluated by neuro while in the hospital   She is not prone to migraines continuously and I do not recommend medications for treatment at this time      Increased stressors:  Her  finished chemo and his last scan showed he is clear of cancer.   He had surgery in [place]. They took 1/2 his liver and 2/3 of his colon and he completed 6 months of chemo  Her son had facial surgery 5/2021 due to baseball injury      Cervical radiculopathy w/ radiating pain/ paresthesia in the right arm:   She has neck pain radiating into her shoulder and down the right arm   She stretches her neck gently and it does loosen up during the day   She tried massage therapy for a while but could not tell any improvement   MRI of spine 6/2021 moderate bilaterally varying degrees of stenosis   She did PT for her neck in the past.   She is to continue to move her neck and shoulders to avoid frozen shoulder      Arthritis and intermittent edema to hands bilaterally:   Inflammatory labs negative in 7/2021  She notices joint pain in the fingers on her right hand.  Her sister has RA   Continue furosemide 20 mg daily.     Raynaud's: The Raynaud's is not as bad as it used to be    Stable. + JAMES only 12/2020  JAMES negative for the first time on labs in 12/2023   Keep hands and feet warm during winter months      Vitamin D def: Labs ordered and we will adjust if indicated  12/24   I would  like to see her levels in the 40's   Discontinue scripted Vitamin D due to concerns for bone loss with higher doses   Recommend she begin OTC Vitamin D3 5000 UT daily      Mild prolapse uterus: Pap normal 8/2022.   Vaginal exam small polyp noted 8/2022      Mammo was normal in 12/23 and ordered 12/24   Breast exam shows dense breast with fibrocystic changes bilaterally, no masses felt 12/2024  Discussed MRI of breast due to breast density. Explained out of pocket cost with screening 8/2022- she will consider     BD was normal 10/17. Continue OTC Calcium 600 mg BID, OTC Vitamin D3 5000 UT daily and eat 2 servings of calcium enriched foods daily.  Discussed importance of weight bearing exercises    Colon 9/2022 adenoma and Q 5      Ophth:  She had LASIK in 2000 OU. She relies on her glasses more for distance and near. No history of glaucoma or MD.      Hep C negative in 12/2020   Hep B 4/2021 and 5/2021  Flu 9/20, 2021, 2022, 11/23, 10/24  TDAP 2010, 8/2/2022  MMR 9/19  Shingrix 11/3/2020 and 2/9/2021   Pfizer CVD vaccine 3/10/2021 and 3/30/2021, 11/8/2023     Some elements in the chart were copied from Dr. Gurrola's last office visit with patient.   Notes have been updated where appropriate, and reflect my current medical decision making from today.      Return in 1 year for CPE or sooner if needed   (CPE due 12/2025)      Scribe Attestation  By signing my name below, I, Carly Melara , Scribe   attest that this documentation has been prepared under the direction and in the presence of Alicja Gurrola MD.

## 2024-12-05 ENCOUNTER — LAB (OUTPATIENT)
Dept: LAB | Facility: LAB | Age: 59
End: 2024-12-05
Payer: COMMERCIAL

## 2024-12-05 ENCOUNTER — APPOINTMENT (OUTPATIENT)
Dept: PRIMARY CARE | Facility: CLINIC | Age: 59
End: 2024-12-05
Payer: COMMERCIAL

## 2024-12-05 VITALS
BODY MASS INDEX: 28.12 KG/M2 | WEIGHT: 175 LBS | DIASTOLIC BLOOD PRESSURE: 60 MMHG | OXYGEN SATURATION: 98 % | HEIGHT: 66 IN | HEART RATE: 68 BPM | SYSTOLIC BLOOD PRESSURE: 118 MMHG

## 2024-12-05 DIAGNOSIS — Z00.00 HEALTHCARE MAINTENANCE: Primary | Chronic | ICD-10-CM

## 2024-12-05 DIAGNOSIS — Z00.00 HEALTHCARE MAINTENANCE: ICD-10-CM

## 2024-12-05 DIAGNOSIS — R77.2 ELEVATED ALPHA-FETOPROTEIN: ICD-10-CM

## 2024-12-05 DIAGNOSIS — E55.9 VITAMIN D DEFICIENCY: ICD-10-CM

## 2024-12-05 DIAGNOSIS — R94.31 ABNORMAL EKG: ICD-10-CM

## 2024-12-05 DIAGNOSIS — I73.00 RAYNAUD'S DISEASE WITHOUT GANGRENE: ICD-10-CM

## 2024-12-05 DIAGNOSIS — G44.86 CERVICOGENIC HEADACHE: ICD-10-CM

## 2024-12-05 DIAGNOSIS — G43.109 MIGRAINE WITH AURA AND WITHOUT STATUS MIGRAINOSUS, NOT INTRACTABLE: ICD-10-CM

## 2024-12-05 PROBLEM — M54.12 CERVICAL RADICULOPATHY AT C5: Status: RESOLVED | Noted: 2023-10-05 | Resolved: 2024-12-05

## 2024-12-05 PROBLEM — Z86.73 HISTORY OF TIA (TRANSIENT ISCHEMIC ATTACK): Status: RESOLVED | Noted: 2023-10-05 | Resolved: 2024-12-05

## 2024-12-05 LAB
25(OH)D3 SERPL-MCNC: 110 NG/ML (ref 30–100)
AFP SERPL-MCNC: 16 NG/ML (ref 0–9)
ALBUMIN SERPL BCP-MCNC: 4.5 G/DL (ref 3.4–5)
ALP SERPL-CCNC: 110 U/L (ref 33–110)
ALT SERPL W P-5'-P-CCNC: 29 U/L (ref 7–45)
ANION GAP SERPL CALC-SCNC: 13 MMOL/L (ref 10–20)
AST SERPL W P-5'-P-CCNC: 23 U/L (ref 9–39)
BILIRUB SERPL-MCNC: 0.6 MG/DL (ref 0–1.2)
BUN SERPL-MCNC: 12 MG/DL (ref 6–23)
CALCIUM SERPL-MCNC: 9.6 MG/DL (ref 8.6–10.6)
CHLORIDE SERPL-SCNC: 105 MMOL/L (ref 98–107)
CHOLEST SERPL-MCNC: 265 MG/DL (ref 0–199)
CHOLESTEROL/HDL RATIO: 3.7
CO2 SERPL-SCNC: 30 MMOL/L (ref 21–32)
CREAT SERPL-MCNC: 0.61 MG/DL (ref 0.5–1.05)
EGFRCR SERPLBLD CKD-EPI 2021: >90 ML/MIN/1.73M*2
ERYTHROCYTE [DISTWIDTH] IN BLOOD BY AUTOMATED COUNT: 12.7 % (ref 11.5–14.5)
EST. AVERAGE GLUCOSE BLD GHB EST-MCNC: 103 MG/DL
GLUCOSE SERPL-MCNC: 90 MG/DL (ref 74–99)
HBA1C MFR BLD: 5.2 %
HCT VFR BLD AUTO: 41.3 % (ref 36–46)
HDLC SERPL-MCNC: 71.9 MG/DL
HGB BLD-MCNC: 13 G/DL (ref 12–16)
LDLC SERPL CALC-MCNC: 173 MG/DL
MCH RBC QN AUTO: 29.5 PG (ref 26–34)
MCHC RBC AUTO-ENTMCNC: 31.5 G/DL (ref 32–36)
MCV RBC AUTO: 94 FL (ref 80–100)
NON HDL CHOLESTEROL: 193 MG/DL (ref 0–149)
NRBC BLD-RTO: 0 /100 WBCS (ref 0–0)
PLATELET # BLD AUTO: 358 X10*3/UL (ref 150–450)
POTASSIUM SERPL-SCNC: 4.6 MMOL/L (ref 3.5–5.3)
PROT SERPL-MCNC: 7.1 G/DL (ref 6.4–8.2)
RBC # BLD AUTO: 4.41 X10*6/UL (ref 4–5.2)
SODIUM SERPL-SCNC: 143 MMOL/L (ref 136–145)
TRIGL SERPL-MCNC: 102 MG/DL (ref 0–149)
TSH SERPL-ACNC: 2.18 MIU/L (ref 0.44–3.98)
VLDL: 20 MG/DL (ref 0–40)
WBC # BLD AUTO: 5.8 X10*3/UL (ref 4.4–11.3)

## 2024-12-05 PROCEDURE — 82306 VITAMIN D 25 HYDROXY: CPT

## 2024-12-05 PROCEDURE — 36415 COLL VENOUS BLD VENIPUNCTURE: CPT

## 2024-12-05 PROCEDURE — 82105 ALPHA-FETOPROTEIN SERUM: CPT

## 2024-12-05 PROCEDURE — 99396 PREV VISIT EST AGE 40-64: CPT | Performed by: INTERNAL MEDICINE

## 2024-12-05 PROCEDURE — 1036F TOBACCO NON-USER: CPT | Performed by: INTERNAL MEDICINE

## 2024-12-05 PROCEDURE — 80061 LIPID PANEL: CPT

## 2024-12-05 PROCEDURE — 80053 COMPREHEN METABOLIC PANEL: CPT

## 2024-12-05 PROCEDURE — 83036 HEMOGLOBIN GLYCOSYLATED A1C: CPT

## 2024-12-05 PROCEDURE — 93000 ELECTROCARDIOGRAM COMPLETE: CPT | Performed by: INTERNAL MEDICINE

## 2024-12-05 PROCEDURE — 3008F BODY MASS INDEX DOCD: CPT | Performed by: INTERNAL MEDICINE

## 2024-12-05 PROCEDURE — 84443 ASSAY THYROID STIM HORMONE: CPT

## 2024-12-05 PROCEDURE — 85027 COMPLETE CBC AUTOMATED: CPT

## 2024-12-05 RX ORDER — ACETAMINOPHEN 500 MG
5000 TABLET ORAL DAILY
Qty: 90 TABLET | Refills: 3 | Status: SHIPPED | OUTPATIENT
Start: 2024-12-05 | End: 2025-12-05

## 2024-12-05 NOTE — RESULT ENCOUNTER NOTE
Chuckie Fuentes-  The cholesterol shows that the LDL is too high and needs to be closer to 100   Stop all VITAMIN D products with high level now   The alpha- fetoprotein is till mildly elevated and not worse   Thyroid is normal   Glucose is good range   Rest of the labs are good range

## 2024-12-05 NOTE — PATIENT INSTRUCTIONS
It was a pleasure to see you today.  I would like to remind you about importance of a healthy lifestyle in order to improve your well-being and live longer. Try to engage in physical activities for at least 150 minutes per week.  Eat about 10 servings of fruits and vegetables daily. My advice is 2 servings of fruits and 8 servings of vegetables. For vegetables choose at least half of them green and at least half of them fresh.  Please avoid sugar, salt, fried food and saturated fat.  Weight loss is advised. Target BMI: below 25. Please follow low carbohydrate diet and daily exercise routine for at least 30 minutes. Nutritional consultation is available, please let me know if you are interested. I will be happy to discuss details with you if interested.   Have a good day and stay well.      The ability to age comfortably depends on how you invest in your body.   Include physical activity in your daily routine. Physical activity increases blood flow to your whole body, including your brain. ...  Eat a healthy diet. A heart-healthy diet may benefit your brain.   Stay mentally active. Be social.   Treat cardiovascular disease.  No smoking, excessive EtOH intake or illicit drug use.

## 2024-12-12 ENCOUNTER — HOSPITAL ENCOUNTER (OUTPATIENT)
Dept: RADIOLOGY | Facility: CLINIC | Age: 59
Discharge: HOME | End: 2024-12-12
Payer: COMMERCIAL

## 2024-12-12 VITALS — BODY MASS INDEX: 27.8 KG/M2 | HEIGHT: 66 IN | WEIGHT: 173 LBS

## 2024-12-12 DIAGNOSIS — Z12.31 SCREENING MAMMOGRAM FOR BREAST CANCER: ICD-10-CM

## 2024-12-12 PROCEDURE — 77067 SCR MAMMO BI INCL CAD: CPT

## 2024-12-17 ENCOUNTER — HOSPITAL ENCOUNTER (OUTPATIENT)
Dept: CARDIOLOGY | Facility: CLINIC | Age: 59
Discharge: HOME | End: 2024-12-17
Payer: COMMERCIAL

## 2024-12-17 DIAGNOSIS — R94.31 ABNORMAL EKG: ICD-10-CM

## 2024-12-17 LAB
AORTIC VALVE PEAK VELOCITY: 1.05 M/S
AV PEAK GRADIENT: 4 MMHG
AVA (PEAK VEL): 3.16 CM2
EJECTION FRACTION APICAL 4 CHAMBER: 59.5
EJECTION FRACTION: 63 %
LEFT ATRIUM VOLUME AREA LENGTH INDEX BSA: 29.5 ML/M2
LEFT VENTRICLE INTERNAL DIMENSION DIASTOLE: 4.15 CM (ref 3.5–6)
LEFT VENTRICULAR OUTFLOW TRACT DIAMETER: 2.18 CM
MITRAL VALVE E/A RATIO: 0.92
RIGHT VENTRICLE FREE WALL PEAK S': 11 CM/S
RIGHT VENTRICLE PEAK SYSTOLIC PRESSURE: 21.9 MMHG
TRICUSPID ANNULAR PLANE SYSTOLIC EXCURSION: 2.8 CM

## 2024-12-17 PROCEDURE — 93306 TTE W/DOPPLER COMPLETE: CPT | Performed by: INTERNAL MEDICINE

## 2024-12-17 PROCEDURE — 93306 TTE W/DOPPLER COMPLETE: CPT

## 2025-01-08 ENCOUNTER — APPOINTMENT (OUTPATIENT)
Dept: PRIMARY CARE | Facility: CLINIC | Age: 60
End: 2025-01-08
Payer: COMMERCIAL

## 2025-01-10 ENCOUNTER — TELEPHONE (OUTPATIENT)
Dept: PRIMARY CARE | Facility: CLINIC | Age: 60
End: 2025-01-10
Payer: COMMERCIAL

## 2025-01-10 NOTE — TELEPHONE ENCOUNTER
Pt called- pt had appt with Dr. Singleton- cardiologist. Cardiologist ruled out that pt did not have heart attack- He did recommend that pt go on a mild statin

## 2025-01-13 DIAGNOSIS — E78.5 HYPERLIPIDEMIA, UNSPECIFIED HYPERLIPIDEMIA TYPE: Primary | ICD-10-CM

## 2025-01-13 RX ORDER — PRAVASTATIN SODIUM 40 MG/1
40 TABLET ORAL DAILY
Qty: 90 TABLET | Refills: 3 | Status: SHIPPED | OUTPATIENT
Start: 2025-01-13 | End: 2026-01-13

## 2025-03-30 DIAGNOSIS — Z51.81 MEDICATION MONITORING ENCOUNTER: Primary | ICD-10-CM

## 2025-03-30 LAB
ALBUMIN SERPL-MCNC: 4.5 G/DL (ref 3.6–5.1)
ALP SERPL-CCNC: 101 U/L (ref 37–153)
ALT SERPL-CCNC: 33 U/L (ref 6–29)
ANION GAP SERPL CALCULATED.4IONS-SCNC: 8 MMOL/L (CALC) (ref 7–17)
AST SERPL-CCNC: 22 U/L (ref 10–35)
BILIRUB SERPL-MCNC: 0.6 MG/DL (ref 0.2–1.2)
BUN SERPL-MCNC: 12 MG/DL (ref 7–25)
CALCIUM SERPL-MCNC: 9.4 MG/DL (ref 8.6–10.4)
CHLORIDE SERPL-SCNC: 103 MMOL/L (ref 98–110)
CHOLEST SERPL-MCNC: 198 MG/DL
CHOLEST/HDLC SERPL: 2.6 (CALC)
CO2 SERPL-SCNC: 27 MMOL/L (ref 20–32)
CREAT SERPL-MCNC: 0.62 MG/DL (ref 0.5–1.03)
EGFRCR SERPLBLD CKD-EPI 2021: 104 ML/MIN/1.73M2
GLUCOSE SERPL-MCNC: 97 MG/DL (ref 65–99)
HDLC SERPL-MCNC: 76 MG/DL
LDLC SERPL CALC-MCNC: 105 MG/DL (CALC)
NONHDLC SERPL-MCNC: 122 MG/DL (CALC)
POTASSIUM SERPL-SCNC: 5 MMOL/L (ref 3.5–5.3)
PROT SERPL-MCNC: 6.9 G/DL (ref 6.1–8.1)
SODIUM SERPL-SCNC: 138 MMOL/L (ref 135–146)
TRIGL SERPL-MCNC: 79 MG/DL

## 2025-03-30 NOTE — RESULT ENCOUNTER NOTE
Chuckie Fuentes-  The cholesterol is greatly improved   The one liver test is barely elevated and from the pravastatin and not enough to stop medication but should repeat in 4 months again  and will put in order   Rest of the labs are good range

## 2025-04-01 DIAGNOSIS — E78.5 HYPERLIPIDEMIA, UNSPECIFIED HYPERLIPIDEMIA TYPE: ICD-10-CM

## 2025-04-01 RX ORDER — PRAVASTATIN SODIUM 40 MG/1
40 TABLET ORAL DAILY
Qty: 90 TABLET | Refills: 3 | Status: SHIPPED | OUTPATIENT
Start: 2025-04-01 | End: 2026-04-01

## 2025-08-18 DIAGNOSIS — E78.5 HYPERLIPIDEMIA, UNSPECIFIED HYPERLIPIDEMIA TYPE: Primary | ICD-10-CM

## 2025-08-28 LAB
ALBUMIN SERPL-MCNC: 4.2 G/DL (ref 3.6–5.1)
ALBUMIN SERPL-MCNC: 4.3 G/DL (ref 3.6–5.1)
ALBUMIN/GLOB SERPL: 2 (CALC) (ref 1–2.5)
ALP SERPL-CCNC: 104 U/L (ref 37–153)
ALP SERPL-CCNC: 105 U/L (ref 37–153)
ALT SERPL-CCNC: 44 U/L (ref 6–29)
ALT SERPL-CCNC: 45 U/L (ref 6–29)
ANION GAP SERPL CALCULATED.4IONS-SCNC: 6 MMOL/L (CALC) (ref 7–17)
AST SERPL-CCNC: 28 U/L (ref 10–35)
AST SERPL-CCNC: 28 U/L (ref 10–35)
BILIRUB DIRECT SERPL-MCNC: 0.1 MG/DL
BILIRUB INDIRECT SERPL-MCNC: 0.4 MG/DL (CALC) (ref 0.2–1.2)
BILIRUB SERPL-MCNC: 0.5 MG/DL (ref 0.2–1.2)
BILIRUB SERPL-MCNC: 0.5 MG/DL (ref 0.2–1.2)
BUN SERPL-MCNC: 12 MG/DL (ref 7–25)
CALCIUM SERPL-MCNC: 8.8 MG/DL (ref 8.6–10.4)
CHLORIDE SERPL-SCNC: 105 MMOL/L (ref 98–110)
CHOLEST SERPL-MCNC: 190 MG/DL
CHOLEST/HDLC SERPL: 2.6 (CALC)
CO2 SERPL-SCNC: 28 MMOL/L (ref 20–32)
CREAT SERPL-MCNC: 0.6 MG/DL (ref 0.5–1.03)
EGFRCR SERPLBLD CKD-EPI 2021: 104 ML/MIN/1.73M2
GLOBULIN SER CALC-MCNC: 2.2 G/DL (CALC) (ref 1.9–3.7)
GLUCOSE SERPL-MCNC: 92 MG/DL (ref 65–99)
HDLC SERPL-MCNC: 73 MG/DL
LDLC SERPL CALC-MCNC: 100 MG/DL (CALC)
NONHDLC SERPL-MCNC: 117 MG/DL (CALC)
POTASSIUM SERPL-SCNC: 4.6 MMOL/L (ref 3.5–5.3)
PROT SERPL-MCNC: 6.4 G/DL (ref 6.1–8.1)
PROT SERPL-MCNC: 6.5 G/DL (ref 6.1–8.1)
SODIUM SERPL-SCNC: 139 MMOL/L (ref 135–146)
TRIGL SERPL-MCNC: 83 MG/DL

## 2025-12-10 ENCOUNTER — APPOINTMENT (OUTPATIENT)
Dept: PRIMARY CARE | Facility: CLINIC | Age: 60
End: 2025-12-10
Payer: COMMERCIAL